# Patient Record
Sex: MALE | Race: WHITE
[De-identification: names, ages, dates, MRNs, and addresses within clinical notes are randomized per-mention and may not be internally consistent; named-entity substitution may affect disease eponyms.]

---

## 2021-11-22 ENCOUNTER — HOSPITAL ENCOUNTER (EMERGENCY)
Dept: HOSPITAL 96 - M.ERS | Age: 76
LOS: 1 days | Discharge: HOME | End: 2021-11-23
Payer: MEDICARE

## 2021-11-22 VITALS — BODY MASS INDEX: 22.28 KG/M2 | HEIGHT: 68 IN | WEIGHT: 147 LBS

## 2021-11-22 DIAGNOSIS — I49.9: Primary | ICD-10-CM

## 2021-11-22 DIAGNOSIS — I10: ICD-10-CM

## 2021-11-22 DIAGNOSIS — E11.9: ICD-10-CM

## 2021-11-22 DIAGNOSIS — Z79.82: ICD-10-CM

## 2021-11-22 DIAGNOSIS — Z20.822: ICD-10-CM

## 2021-11-22 DIAGNOSIS — F17.210: ICD-10-CM

## 2021-11-22 DIAGNOSIS — Z79.899: ICD-10-CM

## 2021-11-22 LAB
ABSOLUTE BASOPHILS: 0.1 THOU/UL (ref 0–0.2)
ABSOLUTE EOSINOPHILS: 0 THOU/UL (ref 0–0.7)
ABSOLUTE MONOCYTES: 0.9 THOU/UL (ref 0–1.2)
ALBUMIN SERPL-MCNC: 3.9 G/DL (ref 3.4–5)
ALP SERPL-CCNC: 113 U/L (ref 46–116)
ALT SERPL-CCNC: 29 U/L (ref 30–65)
ANION GAP SERPL CALC-SCNC: 10 MMOL/L (ref 7–16)
AST SERPL-CCNC: 20 U/L (ref 15–37)
BASOPHILS NFR BLD AUTO: 1.1 %
BILIRUB SERPL-MCNC: 0.4 MG/DL
BILIRUB UR-MCNC: NEGATIVE MG/DL
BUN SERPL-MCNC: 13 MG/DL (ref 7–18)
CALCIUM SERPL-MCNC: 9.7 MG/DL (ref 8.5–10.1)
CHLORIDE SERPL-SCNC: 103 MMOL/L (ref 98–107)
CO2 SERPL-SCNC: 30 MMOL/L (ref 21–32)
COLOR UR: YELLOW
CREAT SERPL-MCNC: 1 MG/DL (ref 0.6–1.3)
EOSINOPHIL NFR BLD: 0.3 %
GLUCOSE SERPL-MCNC: 148 MG/DL (ref 70–99)
GRANULOCYTES NFR BLD MANUAL: 75.9 %
HCT VFR BLD CALC: 46.9 % (ref 42–52)
HGB BLD-MCNC: 16 GM/DL (ref 14–18)
HYALINE CASTS #/AREA URNS LPF: (no result) /LPF
KETONES UR STRIP-MCNC: NEGATIVE MG/DL
LYMPHOCYTES # BLD: 1.6 THOU/UL (ref 0.8–5.3)
LYMPHOCYTES NFR BLD AUTO: 14.5 %
MCH RBC QN AUTO: 32.7 PG (ref 26–34)
MCHC RBC AUTO-ENTMCNC: 34 G/DL (ref 28–37)
MCV RBC: 96.1 FL (ref 80–100)
MONOCYTES NFR BLD: 8.2 %
MPV: 7.4 FL. (ref 7.2–11.1)
NEUTROPHILS # BLD: 8.1 THOU/UL (ref 1.6–8.1)
NT-PRO BRAIN NAT PEPTIDE: 83 PG/ML (ref ?–300)
NUCLEATED RBCS: 0 /100WBC
PLATELET COUNT*: 246 THOU/UL (ref 150–400)
POTASSIUM SERPL-SCNC: 3.6 MMOL/L (ref 3.5–5.1)
PROT SERPL-MCNC: 8.3 G/DL (ref 6.4–8.2)
PROT UR QL STRIP: (no result)
RBC # BLD AUTO: 4.88 MIL/UL (ref 4.5–6)
RBC # UR STRIP: (no result) /UL
RBC #/AREA URNS HPF: (no result) /HPF (ref 0–2)
RDW-CV: 12.4 % (ref 10.5–14.5)
SODIUM SERPL-SCNC: 143 MMOL/L (ref 136–145)
SP GR UR STRIP: 1.02 (ref 1–1.03)
SQUAMOUS: (no result) /LPF (ref 0–3)
URINE CLARITY: CLEAR
URINE GLUCOSE-RANDOM: (no result)
URINE LEUKOCYTES-REFLEX: NEGATIVE
URINE NITRITE-REFLEX: NEGATIVE
URINE WBC-REFLEX: (no result) /HPF (ref 0–5)
UROBILINOGEN UR STRIP-ACNC: 0.2 E.U./DL (ref 0.2–1)
WBC # BLD AUTO: 10.7 THOU/UL (ref 4–11)

## 2021-11-23 VITALS — DIASTOLIC BLOOD PRESSURE: 70 MMHG | SYSTOLIC BLOOD PRESSURE: 110 MMHG

## 2021-11-23 NOTE — EKG
[place] [address]
Phone:  [phone]                     ELECTROCARDIOGRAM REPORT      
_______________________________________________________________________________
 
Name:         GOSIA MARSH                 Room:                     [facility]#:    [id number]     Account #:     [id number]  
Admission:    21    Attend Phys:                     
Discharge:    21    Date of Birth: [birth year]  
Date of Service: 21  Report #:      [id number]
        [id number]
_______________________________________________________________________________
THIS REPORT FOR:  //name//                      
 
                         [facility] ED
                                       
Test Date:    2021               Test Time:    20:55:33
Pat Name:     GOSIA MARSH              Department:   
Patient ID:   [id number]            Room:          
Gender:                               Technician:   
:          1945               Requested By: Kati Cervantes
Order Number: [id number] MD:   Lei Garsia
                                 Measurements
Intervals                              Axis          
Rate:         108                      P:            56
OK:           190                      QRS:          101
QRSD:         106                      T:            56
QT:           346                                    
QTc:          464                                    
                           Interpretive Statements
Sinus tachycardia
Consider right ventricular hypertrophy
No previous ECG available for comparison
Electronically Signed On 2021 9:32:07 CST by Lei Garsia
[email or web address]
 
 
 
 
 
 
 
 
 
 
 
 
 
 
 
 
 
 
 
 
 
  <ELECTRONICALLY SIGNED>
                                           By: eLi Garsia MD, [facility]      
  21
D: 21   _____________________________________
T: 21   Lei Garsia MD, FACC        /EPI

## 2021-12-27 ENCOUNTER — HOSPITAL ENCOUNTER (OUTPATIENT)
Dept: HOSPITAL 96 - M.CRD | Age: 76
End: 2021-12-27
Attending: INTERNAL MEDICINE
Payer: MEDICARE

## 2021-12-27 DIAGNOSIS — I10: ICD-10-CM

## 2021-12-27 DIAGNOSIS — R00.0: ICD-10-CM

## 2021-12-27 DIAGNOSIS — R07.9: ICD-10-CM

## 2021-12-27 DIAGNOSIS — I08.3: Primary | ICD-10-CM

## 2021-12-27 NOTE — 2DMMODE
[place] [address]
Phone:  [phone] 2 D/M-MODE ECHOCARDIOGRAM     
_______________________________________________________________________________
 
Name:         MAXIMOGOSIA                 Room:                     REG CLI
M.R.#:    [id number]     Account #:     [id number]  
Admission:    21    Attend Phys:   Valdemar Ford,
Discharge:                Date of Birth: [birth year]  
Date of Service: 21 1218  Report #:      [id number]
        [id number]
_______________________________________________________________________________
THIS REPORT FOR:
 
cc:  Ankita Pinzon Beth E. DO Holkins,Bryant VALENTINO MD [facility]       
                                                                       ~
 
--------------- APPROVED REPORT --------------
 
 
Study performed:  2021 11:20:25
 
EXAM: Comprehensive 2D, Doppler, and color-flow 
Echocardiogram 
 
      BSA:         1.78
HR: 90 bpm BP:          140/70 mmHg 
 
Other Information 
Study Quality: Good
 
Indications
Dizziness and Vertigo 
Palpitations
Syncope 
 
2D Dimensions
IVSd:  12.05 (7-11mm) LVOT Diam:  19.83 (18-24mm) 
LVDd:  36.03 mm  
PWd:  10.62 (7-11mm) Ascending Ao:  26.34 (22-36mm)
LVDs:  26.59 (25-40mm) 
Aortic Root:  31.06 mm 
 
Volumes
Left Atrial Volume (Systole) 
    LA ESV Index:  20.70 mL/m2
 
Aortic Valve
AoV Peak Kenny.:  1.15 m/s 
AO Peak Gr.:  5.31 mmHg  LVOT Max P.60 mmHg
AO Mean Gr.:  2.84 mmHg  LVOT Mean P.62 mmHg
    LVOT Max V:  0.81 m/s
AO V2 VTI:  20.82 cm  LVOT Mean V:  0.61 m/s
FREDIS (VTI):  3.36 cm2  LVOT V1 VTI:  22.65 cm
 
Mitral Valve
 
 
[place] [address]
Phone:  [phone]                     2 D/M-MODE ECHOCARDIOGRAM     
_______________________________________________________________________________
 
Name:         GOSIA MARSH                 Room:                     REG CLI
M.RKeya#:    [id number]     Account #:     [id number]  
Admission:    21    Attend Phys:   Valdemar Ford,
Discharge:                Date of Birth: [birth year]  
Date of Service: 21 1218  Report #:      [id number]
        [id number]
_______________________________________________________________________________
    E/A Ratio:  0.77
    MV Decel. Time:  225.84 ms
MV E Max Kenny.:  0.85 m/s 
MV PHT:  65.49 ms  
MVA (PHT):  3.36 cm2  
 
TDI
E/Lateral E':  12.14 E/Medial E':  14.17
   Medial E' Kenny.:  0.06 m/s
   Lateral E' Kenny.:  0.07 m/s
 
Pulmonary Valve
PV Peak Kenny.:  0.99 m/s PV Peak Gr.:  3.90 mmHg
 
Tricuspid Valve
    RAP Estimate:  5.00 mmHg
TR Peak Gr.:  36.16 mmHg RVSP:  41.16 mmHg
    PA Pressure:  41.16 mmHg
 
Left Ventricle
The left ventricle is normal size. There is normal LV segmental wall 
motion. There is normal left ventricular wall thickness. Left 
ventricular systolic function is normal. The left ventricular 
ejection fraction is within the normal range. LVEF is 60-65%. Grade I 
- abnormal relaxation pattern.
 
Right Ventricle
The right ventricle is normal size. The right ventricular systolic 
function is normal.
 
Atria
The left atrium size is normal. The right atrium size is 
normal.
 
Aortic Valve
Mild aortic valve sclerosis. Mild aortic regurgitation. There is no 
aortic valvular stenosis.
 
Mitral Valve
Mitral valve leaflets are mildly thickened. Mitral regurgitation jet 
is eccentrically directed. Mild mitral regurgitation. No evidence of 
mitral valve stenosis.
 
Tricuspid Valve
The tricuspid valve is normal in structure. Mild tricuspid 
regurgitation.
 
 
[place] [address]
Phone:  [phone]                     2 D/M-MODE ECHOCARDIOGRAM     
_______________________________________________________________________________
 
Name:         GOSIA MARSH FARIDA                 Room:                     REG CLI
M.R.#:    [id number]     Account #:     [id number]  
Admission:    21    Attend Phys:   Valdemar Ford,
Discharge:                Date of Birth: [birth year]  
Date of Service: 21 1218  Report #:      [id number]
        [id number]
_______________________________________________________________________________
 
Pulmonic Valve
The pulmonary valve is normal in structure. Trace pulmonic 
regurgitation.
 
Great Vessels
The aortic root is normal in size. IVC is normal in size and 
collapses >50% with inspiration.
 
Pericardium
There is no pericardial effusion.
 
<Conclusion>
The left ventricle is normal size.
There is normal left ventricular wall thickness.
Left ventricular systolic function is normal.
The left ventricular ejection fraction is within the normal 
range.
LVEF is 60-65%.
Grade I - abnormal relaxation pattern.
The right ventricle is normal size.
The left atrium size is normal.
Mild aortic valve sclerosis.
Mild aortic regurgitation.
There is no aortic valvular stenosis.
Mitral valve leaflets are mildly thickened.
Mitral regurgitation jet is eccentrically directed.
Mild mitral regurgitation.
The tricuspid valve is normal in structure.
Mild tricuspid regurgitation.
IVC is normal in size and collapses >50% with inspiration.
There is no pericardial effusion.
There is normal LV segmental wall motion.
 
 
 
 
 
 
 
 
 
 
 
  <ELECTRONICALLY SIGNED>
                                           By: Bryant Stone MD, FACC     
  21
D: 21   _____________________________________
T: 21   Bryant Stone MD, FACC       /INF

## 2021-12-27 NOTE — CARDNUC
[place] [address]
Phone:  [phone]                     CARDIAC NUCLEAR IMAGING REPORT
_______________________________________________________________________________
 
Name:         GOSIA MARSH                 Room:                     REG CLI
M.R.#:    [id number]     Account #:     [id number]  
Admission:    12/27/21    Attend Phys:   Valdemar Ford,
Discharge:                Date of Birth: [birth year]  
Date of Service: 12/27/21 1650  Report #:      [id number]
        [id number] 
_______________________________________________________________________________
THIS REPORT FOR:
 
cc:  Ankita Pinzon Beth E. DO Liston, Michael J. MD [facility]     
                                                                       ~
 
--------------- APPROVED REPORT --------------
 
 
Study performed:  12/27/2021 09:03:55
 
Exam: Nuclear Stress Test
Indication: Chest pain
Patient Location: Out-Patient
Stress Tech: TADEO PINA
Stress Nurse: Starr Feliciano RN
NM Tech:DORA Parker
 
Ht: 5 ft 6 in  Wt: 150 lbs  BSA:  1.77 m2
    BMI:  24.20
 
Medical History
Medical History: Diabetes, HTN, Hyperlipidemia
Medications: AMLODIPINE, ASA, PRAVASTATIN
Allergies: No known drug allergies
Cardiac Risk Factors: Age, DM, HTN, Hyperlipidemia
Exercise History: Physically active
 
Stress Test Details
Stress Test:  Pharmacologic stress testing performed using 0.4 mg of 
regadenoson per 5 mL given IV over 10 seconds.      
  Reason for pharmacologic stress test: 
arthritis.      
HR           
Resting HR:            82 bpm   Max Heart Rate (APMHR): 145 bpm  
Max HR Achieved:  110 bpm   Target HR (85% APMHR): 123 bpm  
% of APMHR:         75         
Recovery HR:            104 bpm        
 
BP           
Resting BP:  147/72 mmHg        
Max BP:       127/53 mmHg        
 
ECG           
Resting ECG:  Sinus Rhythm        
 
 
[place] [address]
Phone:  [phone]                     CARDIAC NUCLEAR IMAGING REPORT
_______________________________________________________________________________
 
Name:         MAXIMORACHIDGOSIA FARIDA                 Room:                     REG CLI
M.R.#:    [id number]     Account #:     [id number]  
Admission:    12/27/21    Attend Phys:   Valdemar Ford,
Discharge:                Date of Birth: [birth year]  
Date of Service: 12/27/21 1650  Report #:      [id number]
        [id number] 
_______________________________________________________________________________
Stress ECG:     Sinus Tachycardia       
ST Change: None          
Arrhythmia:    None         
Recovery ECG: Sinus Rhythm        
Recovery ST Change: None        
Recovery Arrhythmia: None        
 
Clinical
Reason for Termination: Completed protocol
The patient tolerated Lexiscan infusion without significant cardiac 
symptoms.
 
Stress ECG Conclusion
The baseline twelve-lead EKG shows sinus rhythm without significant 
ST segment or T wave abnormality.  EKGs obtained during and post 
Lexiscan infusion show sinus rhythm and sinus tachycardia with no 
significant ST segment changes when compared to baseline.  There were 
no stress-induced arrhythmias.
 
NM EXAM: Myocardial Perfusion REST/STRESS
Imaging Protocol: Rest Tc-99m/Stress Tc-99m 1 day
 
Resting Data
Rest SPECT myocardial perfusion imaging was performed in supine 
position 30 minutes following the intravenous injection of 11.5 mCi 
of Tc-99m Sestamibi.
Time of rest injection: 0740     Date: 12/27/2021
The images were gated to evaluate regional wall motion and calculate 
left ventricular ejection fraction. 
Administration Route: IV
Administration Site: Right AC
 
Pharmacologic Stress
Pharmacologic stress test was performed by injecting Regadenoson 0.4 
mg IV push followed by the intravenous injection of 35.2 mCi of 
Tc-99m Sestamibi.
Time of stress injection: 0920     Date: 12/27/2021
Administration Route: IV
Administration Site: Right AC
Gated Stress SPECT was performed 40 minutes after stress 
injection.
The images were gated to evaluate regional wall motion and calculate 
left ventricular ejection fraction. 
Prone imaging was performed.
 
Study Quality
 
 
[place] [address]
Phone:  [phone]                     CARDIAC NUCLEAR IMAGING REPORT
_______________________________________________________________________________
 
Name:         GOSIA MARSH                 Room:                     REG CLI
M.R.#:    [id number]     Account #:     [id number]  
Admission:    12/27/21    Attend Phys:   Valdemar Ford,
Discharge:                Date of Birth: [birth year]  
Date of Service: 12/27/21 1650  Report #:      [id number]
        [id number] 
_______________________________________________________________________________
Study: Good
Artifact: Mild Diaphragmatic artifact
 
Study Data
At rest, the left ventricular ejection fraction was 69%..   
Post stress, the left ventricular ejection was 78%..   
TID = 0.82.       
 
Perfusion
Perfusion images obtained at rest and post Lexiscan stress in the 
supine position show a large in size moderate intensity defect 
involving the inferior wall that resolves for the most part on 
post-rest prone imaging suggesting diaphragmatic attenuation 
artifact.  No other significant fixed or reversible defects are 
identified.
 
Wall Motion
Normal left ventricular wall motion.
 
Nuclear Conclusion
ECG Findings: negative for ischemia
Clinical Findings: negative for ischemia
Nuclear Findings: negative for ischemia
Exercise Capacity: not assessed
Left Ventricular Function: normal
Risk Study: low
Perfusion images show no defect to suggest infarct or ischemia.  Left 
ventricular systolic function appears normal on gated studies.  This 
is a low risk study. 
 
<Conclusion>
The baseline twelve-lead EKG shows sinus rhythm without significant 
ST segment or T wave abnormality.  EKGs obtained during and post 
Lexiscan infusion show sinus rhythm and sinus tachycardia with no 
significant ST segment changes when compared to baseline.  There were 
no stress-induced arrhythmias.
 
 
 
 
 
 
 
 
  <ELECTRONICALLY SIGNED>
                                           By: Valdemar Ford MD, FACC   
  12/27/21 1650
D: 12/27/21 1650   _____________________________________
T: 12/27/21 1650   Valdemar Ford MD, FACC     /INF